# Patient Record
Sex: MALE | Race: OTHER | Employment: UNEMPLOYED | URBAN - METROPOLITAN AREA
[De-identification: names, ages, dates, MRNs, and addresses within clinical notes are randomized per-mention and may not be internally consistent; named-entity substitution may affect disease eponyms.]

---

## 2021-05-12 ENCOUNTER — OFFICE VISIT (OUTPATIENT)
Dept: FAMILY MEDICINE CLINIC | Facility: CLINIC | Age: 46
End: 2021-05-12
Payer: MEDICAID

## 2021-05-12 VITALS
HEART RATE: 72 BPM | BODY MASS INDEX: 31.02 KG/M2 | SYSTOLIC BLOOD PRESSURE: 114 MMHG | WEIGHT: 193 LBS | RESPIRATION RATE: 16 BRPM | TEMPERATURE: 97 F | HEIGHT: 66 IN | DIASTOLIC BLOOD PRESSURE: 86 MMHG

## 2021-05-12 DIAGNOSIS — K21.00 GASTROESOPHAGEAL REFLUX DISEASE WITH ESOPHAGITIS WITHOUT HEMORRHAGE: Primary | ICD-10-CM

## 2021-05-12 DIAGNOSIS — S61.219A LACERATION OF FINGER WITHOUT COMPLICATION, INITIAL ENCOUNTER: ICD-10-CM

## 2021-05-12 PROBLEM — I31.3 PERICARDIAL EFFUSION: Status: ACTIVE | Noted: 2021-05-12

## 2021-05-12 PROBLEM — I31.39 PERICARDIAL EFFUSION (HCC): Status: ACTIVE | Noted: 2021-05-12

## 2021-05-12 PROBLEM — I31.39 PERICARDIAL EFFUSION: Status: ACTIVE | Noted: 2021-05-12

## 2021-05-12 PROCEDURE — 3008F BODY MASS INDEX DOCD: CPT | Performed by: FAMILY MEDICINE

## 2021-05-12 PROCEDURE — 3074F SYST BP LT 130 MM HG: CPT | Performed by: FAMILY MEDICINE

## 2021-05-12 PROCEDURE — 3079F DIAST BP 80-89 MM HG: CPT | Performed by: FAMILY MEDICINE

## 2021-05-12 PROCEDURE — 99204 OFFICE O/P NEW MOD 45 MIN: CPT | Performed by: FAMILY MEDICINE

## 2021-05-12 RX ORDER — HYDROCODONE BITARTRATE AND ACETAMINOPHEN 5; 325 MG/1; MG/1
1 TABLET ORAL EVERY 8 HOURS PRN
Qty: 30 TABLET | Refills: 0 | Status: SHIPPED | OUTPATIENT
Start: 2021-05-12 | End: 2021-05-22

## 2021-05-12 RX ORDER — ALBUTEROL SULFATE 90 UG/1
AEROSOL, METERED RESPIRATORY (INHALATION) AS NEEDED
COMMUNITY
End: 2021-06-24

## 2021-05-12 RX ORDER — HYDROCODONE BITARTRATE AND ACETAMINOPHEN 5; 325 MG/1; MG/1
1 TABLET ORAL EVERY 6 HOURS PRN
COMMUNITY
Start: 2021-05-05 | End: 2021-05-12

## 2021-05-12 RX ORDER — NAPROXEN 500 MG/1
TABLET ORAL AS NEEDED
COMMUNITY
Start: 2021-05-11

## 2021-05-12 RX ORDER — CEPHALEXIN 500 MG/1
500 CAPSULE ORAL EVERY 6 HOURS
COMMUNITY
Start: 2021-05-06 | End: 2021-05-24 | Stop reason: ALTCHOICE

## 2021-05-12 NOTE — PROGRESS NOTES
Heather Andrade is a 39year old male.   HPI:   Ann López  Is here for follow up on finger lacerations after he was cutting a board and tried to adjust it and got her right 2nd and 3rd digits into the saw blade, he went to the ER where he had imaging done that 114/86 (BP Location: Left arm, Patient Position: Sitting, Cuff Size: large)   Pulse 72   Temp 97.4 °F (36.3 °C) (Temporal)   Resp 16   Ht 5' 6\" (1.676 m)   Wt 193 lb (87.5 kg)   BMI 31.15 kg/m²   GENERAL: well developed, well nourished,in no apparent dist

## 2021-05-24 ENCOUNTER — OFFICE VISIT (OUTPATIENT)
Dept: FAMILY MEDICINE CLINIC | Facility: CLINIC | Age: 46
End: 2021-05-24
Payer: MEDICAID

## 2021-05-24 VITALS
SYSTOLIC BLOOD PRESSURE: 102 MMHG | DIASTOLIC BLOOD PRESSURE: 76 MMHG | BODY MASS INDEX: 31 KG/M2 | HEART RATE: 80 BPM | TEMPERATURE: 98 F | WEIGHT: 190.38 LBS | RESPIRATION RATE: 18 BRPM

## 2021-05-24 DIAGNOSIS — K21.00 GASTROESOPHAGEAL REFLUX DISEASE WITH ESOPHAGITIS WITHOUT HEMORRHAGE: Primary | ICD-10-CM

## 2021-05-24 DIAGNOSIS — M25.512 ACUTE PAIN OF LEFT SHOULDER: ICD-10-CM

## 2021-05-24 DIAGNOSIS — S61.219A LACERATION OF FINGER WITHOUT COMPLICATION, INITIAL ENCOUNTER: ICD-10-CM

## 2021-05-24 PROCEDURE — 3078F DIAST BP <80 MM HG: CPT | Performed by: FAMILY MEDICINE

## 2021-05-24 PROCEDURE — 99214 OFFICE O/P EST MOD 30 MIN: CPT | Performed by: FAMILY MEDICINE

## 2021-05-24 PROCEDURE — 3074F SYST BP LT 130 MM HG: CPT | Performed by: FAMILY MEDICINE

## 2021-05-24 RX ORDER — OMEPRAZOLE 40 MG/1
40 CAPSULE, DELAYED RELEASE ORAL NIGHTLY
Qty: 30 CAPSULE | Refills: 0 | Status: SHIPPED | OUTPATIENT
Start: 2021-05-24 | End: 2021-06-24

## 2021-05-24 RX ORDER — HYDROCODONE BITARTRATE AND ACETAMINOPHEN 5; 325 MG/1; MG/1
1 TABLET ORAL AS NEEDED
COMMUNITY

## 2021-05-24 NOTE — PROGRESS NOTES
Radha Deras is a 39year old male.   HPI:   Andrey Malloy  Is here for follow up on finger lacerations after he was cutting a board and tried to adjust it and got her right 2nd and 3rd digits into the saw blade, he went to the ER where he had imaging done that except for his fingers and his reflux  HEENT: denies recurrent sinus issues  SKIN: denies any unusual skin lesions or rashes  RESPIRATORY: denies shortness of breath with exertion  CARDIOVASCULAR: denies chest pain on exertion  GI: denies abdominal pain an at HS 40 mg.

## 2021-06-08 ENCOUNTER — TELEMEDICINE (OUTPATIENT)
Dept: FAMILY MEDICINE CLINIC | Facility: CLINIC | Age: 46
End: 2021-06-08
Payer: MEDICAID

## 2021-06-08 DIAGNOSIS — K21.00 GASTROESOPHAGEAL REFLUX DISEASE WITH ESOPHAGITIS WITHOUT HEMORRHAGE: Primary | ICD-10-CM

## 2021-06-08 DIAGNOSIS — J45.21 MILD INTERMITTENT ASTHMA WITH ACUTE EXACERBATION: ICD-10-CM

## 2021-06-08 DIAGNOSIS — J98.01 BRONCHOSPASM: ICD-10-CM

## 2021-06-08 PROCEDURE — 99214 OFFICE O/P EST MOD 30 MIN: CPT | Performed by: FAMILY MEDICINE

## 2021-06-08 RX ORDER — ALBUTEROL SULFATE 2.5 MG/3ML
2.5 SOLUTION RESPIRATORY (INHALATION) EVERY 4 HOURS PRN
Qty: 1 EACH | Refills: 3 | Status: SHIPPED | OUTPATIENT
Start: 2021-06-08 | End: 2021-07-08

## 2021-06-08 RX ORDER — ALBUTEROL SULFATE 90 UG/1
2 AEROSOL, METERED RESPIRATORY (INHALATION) EVERY 6 HOURS PRN
Qty: 1 EACH | Refills: 2 | Status: SHIPPED | OUTPATIENT
Start: 2021-06-08 | End: 2021-07-08

## 2021-06-08 RX ORDER — METHYLPREDNISOLONE 4 MG/1
TABLET ORAL
Qty: 1 EACH | Refills: 0 | Status: SHIPPED | OUTPATIENT
Start: 2021-06-08 | End: 2021-06-24 | Stop reason: ALTCHOICE

## 2021-06-08 NOTE — PROGRESS NOTES
This is a telemedicine visit with live, interactive video and audio. Patient understands and accepts financial responsibility for any deductible, co-insurance and/or co-pays associated with this service.     Yoly Anaya was cutting grass the other Oral Tab      • Albuterol Sulfate  (90 Base) MCG/ACT Inhalation Aero Soln Inhale into the lungs as needed for Wheezing. • Sucralfate (CARAFATE OR) Take by mouth.  (Patient not taking: Reported on 5/24/2021 )         OBJECTIVE  Physical Exam:

## 2021-06-24 ENCOUNTER — OFFICE VISIT (OUTPATIENT)
Dept: FAMILY MEDICINE CLINIC | Facility: CLINIC | Age: 46
End: 2021-06-24
Payer: MEDICAID

## 2021-06-24 VITALS
WEIGHT: 189.63 LBS | TEMPERATURE: 98 F | BODY MASS INDEX: 31 KG/M2 | SYSTOLIC BLOOD PRESSURE: 104 MMHG | RESPIRATION RATE: 20 BRPM | DIASTOLIC BLOOD PRESSURE: 70 MMHG | HEART RATE: 92 BPM

## 2021-06-24 DIAGNOSIS — S61.219A LACERATION OF FINGER WITHOUT COMPLICATION, INITIAL ENCOUNTER: ICD-10-CM

## 2021-06-24 DIAGNOSIS — K21.00 GASTROESOPHAGEAL REFLUX DISEASE WITH ESOPHAGITIS WITHOUT HEMORRHAGE: ICD-10-CM

## 2021-06-24 DIAGNOSIS — Z00.00 ROUTINE HEALTH MAINTENANCE: Primary | ICD-10-CM

## 2021-06-24 DIAGNOSIS — R73.9 BLOOD GLUCOSE ELEVATED: ICD-10-CM

## 2021-06-24 DIAGNOSIS — J98.01 BRONCHOSPASM: ICD-10-CM

## 2021-06-24 DIAGNOSIS — J45.21 MILD INTERMITTENT ASTHMA WITH ACUTE EXACERBATION: ICD-10-CM

## 2021-06-24 PROCEDURE — 99214 OFFICE O/P EST MOD 30 MIN: CPT | Performed by: FAMILY MEDICINE

## 2021-06-24 PROCEDURE — 80061 LIPID PANEL: CPT | Performed by: FAMILY MEDICINE

## 2021-06-24 PROCEDURE — 83036 HEMOGLOBIN GLYCOSYLATED A1C: CPT | Performed by: FAMILY MEDICINE

## 2021-06-24 PROCEDURE — 3074F SYST BP LT 130 MM HG: CPT | Performed by: FAMILY MEDICINE

## 2021-06-24 PROCEDURE — 80053 COMPREHEN METABOLIC PANEL: CPT | Performed by: FAMILY MEDICINE

## 2021-06-24 PROCEDURE — 84443 ASSAY THYROID STIM HORMONE: CPT | Performed by: FAMILY MEDICINE

## 2021-06-24 PROCEDURE — 85025 COMPLETE CBC W/AUTO DIFF WBC: CPT | Performed by: FAMILY MEDICINE

## 2021-06-24 PROCEDURE — 3078F DIAST BP <80 MM HG: CPT | Performed by: FAMILY MEDICINE

## 2021-06-24 RX ORDER — OMEPRAZOLE 40 MG/1
40 CAPSULE, DELAYED RELEASE ORAL 2 TIMES DAILY
Qty: 60 CAPSULE | Refills: 2 | Status: SHIPPED | OUTPATIENT
Start: 2021-06-24 | End: 2021-07-24

## 2021-06-24 NOTE — PROGRESS NOTES
Stuart Lowe is a 39year old male. HPI:   Neptali Moses is here for follow up on his finger laceration after he cut it on a table saw.  It is healing nicely he still has some sensitivity to the distal tip of the right middle finger, also still has some residu (Temporal)   Resp 20   Wt 189 lb 9.6 oz (86 kg)   BMI 30.60 kg/m²   GENERAL: well developed, well nourished,in no apparent distress  SKIN: no rashes,no suspicious lesions  HEENT: atraumatic, normocephalic,ears and throat are clear  NECK: supple,no adenopat

## 2021-06-26 ENCOUNTER — TELEPHONE (OUTPATIENT)
Dept: FAMILY MEDICINE CLINIC | Facility: CLINIC | Age: 46
End: 2021-06-26

## 2021-06-26 DIAGNOSIS — R73.09 ELEVATED HEMOGLOBIN A1C: ICD-10-CM

## 2021-06-26 DIAGNOSIS — R79.89 ELEVATED TSH: Primary | ICD-10-CM

## 2021-06-26 NOTE — TELEPHONE ENCOUNTER
[facility] sent to pt regarding DS result note and recommendations.  Future lab orders were placed by DS

## 2021-06-26 NOTE — TELEPHONE ENCOUNTER
----- Message from Sal Garza DO sent at 6/26/2021  8:57 AM CDT -----  [email or web address] for [facility] most part looked good.  His lipids were  Excellent kidney, liver function,  and blood count were all normal, but his blood sugar was sl

## 2021-08-30 ENCOUNTER — TELEPHONE (OUTPATIENT)
Dept: FAMILY MEDICINE CLINIC | Facility: CLINIC | Age: 46
End: 2021-08-30

## 2021-10-26 ENCOUNTER — TELEPHONE (OUTPATIENT)
Dept: FAMILY MEDICINE CLINIC | Facility: CLINIC | Age: 46
End: 2021-10-26

## 2023-01-26 ENCOUNTER — TELEPHONE (OUTPATIENT)
Dept: FAMILY MEDICINE CLINIC | Facility: CLINIC | Age: 48
End: 2023-01-26

## 2023-01-26 NOTE — TELEPHONE ENCOUNTER
Future Appointments   Date Time Provider Suzanne Lopez   1/31/2023  2:00 PM Soham Canas DO [facility] MYRIAM Lantigua
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
## 2023-01-31 ENCOUNTER — OFFICE VISIT (OUTPATIENT)
Dept: FAMILY MEDICINE CLINIC | Facility: CLINIC | Age: 48
End: 2023-01-31
Payer: COMMERCIAL

## 2023-01-31 VITALS
RESPIRATION RATE: 18 BRPM | DIASTOLIC BLOOD PRESSURE: 70 MMHG | SYSTOLIC BLOOD PRESSURE: 108 MMHG | HEART RATE: 85 BPM | TEMPERATURE: 97 F | WEIGHT: 208.13 LBS | OXYGEN SATURATION: 98 % | BODY MASS INDEX: 34 KG/M2

## 2023-01-31 DIAGNOSIS — M54.2 NECK PAIN, ACUTE: Primary | ICD-10-CM

## 2023-01-31 DIAGNOSIS — M79.10 MYALGIA: ICD-10-CM

## 2023-01-31 DIAGNOSIS — M54.50 ACUTE LEFT-SIDED LOW BACK PAIN WITHOUT SCIATICA: ICD-10-CM

## 2023-01-31 PROCEDURE — 99213 OFFICE O/P EST LOW 20 MIN: CPT | Performed by: FAMILY MEDICINE

## 2023-01-31 PROCEDURE — 3074F SYST BP LT 130 MM HG: CPT | Performed by: FAMILY MEDICINE

## 2023-01-31 PROCEDURE — 3078F DIAST BP <80 MM HG: CPT | Performed by: FAMILY MEDICINE

## 2023-01-31 RX ORDER — ALBUTEROL SULFATE 90 UG/1
2 AEROSOL, METERED RESPIRATORY (INHALATION) EVERY 6 HOURS PRN
Qty: 1 EACH | Refills: 1 | Status: SHIPPED | OUTPATIENT
Start: 2023-01-31 | End: 2023-03-02

## 2023-07-10 ENCOUNTER — TELEPHONE (OUTPATIENT)
Dept: FAMILY MEDICINE CLINIC | Facility: CLINIC | Age: 48
End: 2023-07-10

## 2023-07-10 NOTE — TELEPHONE ENCOUNTER
[facility] CALLED BACK AND [facility] IS ONLY ABLE TO FIND 15-20MM THIGH HIGH COMPRESSION STOCKINGS. IS THIS GOING TO BE OK?  DOESN'T WANT TO GET IF ITS NOT GOING TO WORK.     PLEASE ADV    THANK YOU

## 2023-07-10 NOTE — TELEPHONE ENCOUNTER
PATIENT SPOUSE CALLING THIS MORNING AND SCHEDULED PATIENT FOR TOMORROW AFTERNOON TO FOLLOW UP FROM ER. SPOUSE SAYS PATIENT INSTRUCTED TO WEAR THIGH HIGH RACHELE HOSE ASAP. PATIENT SPOUSE ASKING WHICH KIND. MANY DIFFERENT TYPES PER SPOUSE. IF WE NEED TO WAIT UNTIL TOMORROW UNTLI PATIENT IS SEEN TO DETERMINE TYPE THAT WOULD BE OK. SPOUSE JUST WONDERING IF THERE IS A BEGINNERS ONE?

## 2023-07-10 NOTE — TELEPHONE ENCOUNTER
Lenard Travis can get them in different, compression 10-15 mm to star and can get them sometimes at [facility] and can order on line

## 2023-07-10 NOTE — TELEPHONE ENCOUNTER
Future Appointments   Date Time Provider Suzanne Lopez   7/12/2023  9:40 AM Kira Villalba, [facility] Che Bauman     RN spoke to girlfriend and advised of the information below- verbalized understanding

## 2023-07-12 ENCOUNTER — OFFICE VISIT (OUTPATIENT)
Dept: FAMILY MEDICINE CLINIC | Facility: CLINIC | Age: 48
End: 2023-07-12
Payer: MEDICAID

## 2023-07-12 ENCOUNTER — HOSPITAL ENCOUNTER (OUTPATIENT)
Dept: GENERAL RADIOLOGY | Age: 48
Discharge: HOME | End: 2023-07-12
Attending: FAMILY MEDICINE
Payer: MEDICAID

## 2023-07-12 VITALS
OXYGEN SATURATION: 97 % | BODY MASS INDEX: 31 KG/M2 | RESPIRATION RATE: 18 BRPM | SYSTOLIC BLOOD PRESSURE: 106 MMHG | HEART RATE: 82 BPM | WEIGHT: 193.38 LBS | DIASTOLIC BLOOD PRESSURE: 64 MMHG | TEMPERATURE: 97 F

## 2023-07-12 DIAGNOSIS — G89.29 CHRONIC PAIN OF LEFT KNEE: Primary | ICD-10-CM

## 2023-07-12 DIAGNOSIS — J45.21 MILD INTERMITTENT ASTHMA WITH ACUTE EXACERBATION: ICD-10-CM

## 2023-07-12 DIAGNOSIS — M25.562 CHRONIC PAIN OF LEFT KNEE: ICD-10-CM

## 2023-07-12 DIAGNOSIS — I83.12 VARICOSE VEINS OF LEFT LOWER EXTREMITY WITH INFLAMMATION: ICD-10-CM

## 2023-07-12 DIAGNOSIS — G89.29 CHRONIC PAIN OF LEFT KNEE: ICD-10-CM

## 2023-07-12 DIAGNOSIS — M25.562 CHRONIC PAIN OF LEFT KNEE: Primary | ICD-10-CM

## 2023-07-12 DIAGNOSIS — M25.562 PATELLOFEMORAL ARTHRALGIA OF LEFT KNEE: ICD-10-CM

## 2023-07-12 PROCEDURE — 73562 X-RAY EXAM OF KNEE 3: CPT | Performed by: FAMILY MEDICINE

## 2023-07-12 PROCEDURE — 99214 OFFICE O/P EST MOD 30 MIN: CPT | Performed by: FAMILY MEDICINE

## 2023-07-12 PROCEDURE — 3074F SYST BP LT 130 MM HG: CPT | Performed by: FAMILY MEDICINE

## 2023-07-12 PROCEDURE — 3078F DIAST BP <80 MM HG: CPT | Performed by: FAMILY MEDICINE

## 2023-07-12 RX ORDER — ALBUTEROL SULFATE 90 UG/1
2 AEROSOL, METERED RESPIRATORY (INHALATION) EVERY 6 HOURS PRN
Qty: 1 EACH | Refills: 1 | Status: SHIPPED | OUTPATIENT
Start: 2023-07-12 | End: 2023-08-11

## 2023-07-12 RX ORDER — ALBUTEROL SULFATE 2.5 MG/3ML
2.5 SOLUTION RESPIRATORY (INHALATION) ONCE
Status: DISCONTINUED | OUTPATIENT
Start: 2023-07-12 | End: 2023-07-12

## 2023-07-12 RX ORDER — ALBUTEROL SULFATE 2.5 MG/3ML
2.5 SOLUTION RESPIRATORY (INHALATION) EVERY 4 HOURS PRN
Qty: 1 EACH | Refills: 3 | Status: SHIPPED | OUTPATIENT
Start: 2023-07-12

## 2024-03-23 ENCOUNTER — OFFICE VISIT (OUTPATIENT)
Dept: FAMILY MEDICINE CLINIC | Facility: CLINIC | Age: 49
End: 2024-03-23
Payer: MEDICAID

## 2024-03-23 VITALS
HEART RATE: 86 BPM | BODY MASS INDEX: 32.49 KG/M2 | DIASTOLIC BLOOD PRESSURE: 84 MMHG | WEIGHT: 195 LBS | HEIGHT: 65 IN | TEMPERATURE: 99 F | RESPIRATION RATE: 16 BRPM | SYSTOLIC BLOOD PRESSURE: 132 MMHG

## 2024-03-23 DIAGNOSIS — J02.9 PHARYNGITIS, UNSPECIFIED ETIOLOGY: Primary | ICD-10-CM

## 2024-03-23 DIAGNOSIS — J06.9 VIRAL URI: ICD-10-CM

## 2024-03-23 DIAGNOSIS — H92.01 OTALGIA, RIGHT: ICD-10-CM

## 2024-03-23 LAB
CONTROL LINE PRESENT WITH A CLEAR BACKGROUND (YES/NO): YES YES/NO
KIT LOT #: 7282 NUMERIC

## 2024-03-23 PROCEDURE — 99214 OFFICE O/P EST MOD 30 MIN: CPT | Performed by: FAMILY MEDICINE

## 2024-03-23 PROCEDURE — 87880 STREP A ASSAY W/OPTIC: CPT | Performed by: FAMILY MEDICINE

## 2024-03-23 RX ORDER — ALBUTEROL SULFATE 2.5 MG/3ML
2.5 SOLUTION RESPIRATORY (INHALATION) EVERY 4 HOURS PRN
Qty: 1 EACH | Refills: 3 | Status: SHIPPED | OUTPATIENT
Start: 2024-03-23

## 2024-03-23 RX ORDER — PREDNISONE 10 MG/1
TABLET ORAL
Qty: 18 TABLET | Refills: 0 | Status: SHIPPED | OUTPATIENT
Start: 2024-03-23

## 2024-03-23 NOTE — PROGRESS NOTES
HPI:   Ford Gifford is a 48 year old male who presents for upper respiratory symptoms for  1  days. Patient reports sore throat, congestion. Has pain swallowing and even talking and also experiencing ear pain. No fever, no abdominal pain, no N/V/D, no sick contacts    Current Outpatient Medications   Medication Sig Dispense Refill    predniSONE 10 MG Oral Tab 3 pills for 3 days, 2 pills for 3 days, then one pill daily for 3 days 18 tablet 0    albuterol (2.5 MG/3ML) 0.083% Inhalation Nebu Soln Take 3 mL (2.5 mg total) by nebulization every 4 (four) hours as needed for Wheezing. 1 each 3    Omeprazole 40 MG Oral Capsule Delayed Release Take 1 capsule (40 mg total) by mouth 2 (two) times a day. (Patient not taking: Reported on 1/31/2023) 60 capsule 2    HYDROcodone-acetaminophen 5-325 MG Oral Tab Take 1 tablet by mouth as needed for Pain. (Patient not taking: Reported on 1/31/2023)        Past Medical History:   Diagnosis Date    Rheumatoid arthritis(714.0)       Past Surgical History:   Procedure Laterality Date    OPEN HEART SURGICAL PROFILE        Family History   Problem Relation Age of Onset    Colon Cancer Paternal Grandmother     Other (cad) Paternal Grandfather 52        CAD, with CABG      Social History     Socioeconomic History    Marital status:    Tobacco Use    Smoking status: Former    Smokeless tobacco: Never   Vaping Use    Vaping Use: Never used   Substance and Sexual Activity    Alcohol use: No    Drug use: Not Currently     Comment: marijuana         REVIEW OF SYSTEMS:   GENERAL: feels well otherwise  SKIN: no rashes  EYES:denies blurred vision or double vision  HEENT: congested, sore throat,  hurts to swallow, right ear pain  LUNGS: denies shortness of breath with exertion  CARDIOVASCULAR: denies chest pain on exertion  GI: no nausea or abdominal pain  NEURO: denies headaches    EXAM:   /84   Pulse 86   Temp 99 °F (37.2 °C) (Temporal)   Resp 16   Ht 5' 5\" (1.651 m)    Wt 195 lb (88.5 kg)   BMI 32.45 kg/m²   GENERAL: well developed, well nourished,in no apparent distress  SKIN: no rashes,no suspicious lesions  EYES:PERRLA, EOMI, normal optic disk,conjunctiva are clear  HEENT: atraumatic, normocephalic,ears and throat shows the posterior pharynx to be erythematous and edematous, there is exudate present on the left  the right side is larger than the left  NECK: supple,no adenopathy,no bruits  LUNGS: clear to auscultation  CARDIO: RRR without murmur      ASSESSMENT AND PLAN:     Encounter Diagnoses   Name Primary?    Pharyngitis, unspecified etiology Yes    Otalgia, right     Viral URI      Salt water gargles no motrin, vcan take tylenol  Orders Placed This Encounter   Procedures    Rapid Strep       Meds & Refills for this Visit:  Requested Prescriptions     Signed Prescriptions Disp Refills    predniSONE 10 MG Oral Tab 18 tablet 0     Sig: 3 pills for 3 days, 2 pills for 3 days, then one pill daily for 3 days    albuterol (2.5 MG/3ML) 0.083% Inhalation Nebu Soln 1 each 3     Sig: Take 3 mL (2.5 mg total) by nebulization every 4 (four) hours as needed for Wheezing.       Imaging & Consults:  None    The patient is asked to return if sx's persist or worsen.

## 2024-03-31 ENCOUNTER — OFFICE VISIT (OUTPATIENT)
Dept: FAMILY MEDICINE CLINIC | Facility: CLINIC | Age: 49
End: 2024-03-31
Payer: MEDICAID

## 2024-03-31 VITALS
HEIGHT: 65 IN | OXYGEN SATURATION: 95 % | TEMPERATURE: 99 F | WEIGHT: 192 LBS | HEART RATE: 116 BPM | DIASTOLIC BLOOD PRESSURE: 84 MMHG | SYSTOLIC BLOOD PRESSURE: 122 MMHG | RESPIRATION RATE: 16 BRPM | BODY MASS INDEX: 31.99 KG/M2

## 2024-03-31 DIAGNOSIS — R06.2 WHEEZING: ICD-10-CM

## 2024-03-31 DIAGNOSIS — R05.1 ACUTE COUGH: Primary | ICD-10-CM

## 2024-03-31 PROCEDURE — 99213 OFFICE O/P EST LOW 20 MIN: CPT | Performed by: NURSE PRACTITIONER

## 2024-03-31 NOTE — PROGRESS NOTES
CHIEF COMPLAINT:     Chief Complaint   Patient presents with    Cough     Chills, sweating. 102 fever and chills, ratle  Prednisone for about 1 week just finished.  OTC nebulizer not working       HPI:   Ford Gifford is a 48 year old male who presents for cough, congestion,fevers, chills, body aches and wheezing.  Patient reports symptoms present for the past week. Seen by pcp on 3/23. Placed on prednisone which seemed to help some. Notes symptoms never resolved and have been worsening over the past 4 days. Notes fevers to 102 and home nebulizers are note helping. REports shortness of breath and DEJESUS. Denies cp or dizziness..  Treating symptoms otc fever meds and nebulizers..   Tolerates PO well at home. No n/v/d.  Denies any other aggravating or relieving factors at home. Denies any other treatment attempts prior to arrival.       Current Outpatient Medications   Medication Sig Dispense Refill    predniSONE 10 MG Oral Tab 3 pills for 3 days, 2 pills for 3 days, then one pill daily for 3 days 18 tablet 0    albuterol (2.5 MG/3ML) 0.083% Inhalation Nebu Soln Take 3 mL (2.5 mg total) by nebulization every 4 (four) hours as needed for Wheezing. 1 each 3    Omeprazole 40 MG Oral Capsule Delayed Release Take 1 capsule (40 mg total) by mouth 2 (two) times a day. (Patient not taking: Reported on 1/31/2023) 60 capsule 2    HYDROcodone-acetaminophen 5-325 MG Oral Tab Take 1 tablet by mouth as needed for Pain. (Patient not taking: Reported on 1/31/2023)        Past Medical History:   Diagnosis Date    Rheumatoid arthritis(714.0)       Past Surgical History:   Procedure Laterality Date    OPEN HEART SURGICAL PROFILE           Social History     Socioeconomic History    Marital status:    Tobacco Use    Smoking status: Former    Smokeless tobacco: Never   Vaping Use    Vaping Use: Never used   Substance and Sexual Activity    Alcohol use: No    Drug use: Not Currently     Comment: marijuana         REVIEW OF  SYSTEMS:   GENERAL: + fevers. Notes decreased appetite  SKIN: no rashes or abnormal skin lesions  HEENT: Denies sore throat, + sinus symptoms, Denies ear pain  LUNGS: + nonproductive cough, +shortness of breath andwheezing,   CARDIOVASCULAR: denies chest pain or palpitations   GI: denies N/V/C or abdominal pain  NEURO: Denies headaches    EXAM:   /84   Pulse 116   Temp 99.1 °F (37.3 °C)   Resp 16   Ht 5' 5\" (1.651 m)   Wt 192 lb (87.1 kg)   SpO2 95%   BMI 31.95 kg/m²   GENERAL: well developed, well nourished,in no apparent distress  SKIN: no rashes,no suspicious lesions  HEAD: atraumatic, normocephalic.    EYES: conjunctiva clear  EARS: TM's intact and without erythema, no bulging, no retraction,no fluid, bony landmarks visualized. No erythema or swelling noted to ear canals or external ears.   NOSE: Nostrils patent, clear nasal discharge, nasal mucosa reddened   THROAT: Oral mucosa pink, moist. Posterior pharynx is mildly erythematous. No exudates. No tonsillar hypertrophy noted.  No trismus. Uvula midline with no swelling. Voice clear/normal. No stridor  NECK: Supple, non-tender  LUNGS: Nonproductive cough noted. Lungs coarse bilat. + inspiratory/expiratory wheezing in bilat lung fields.  Breathing is non labored.  CARDIO: RRR without murmur  EXTREMITIES: no cyanosis, clubbing or edema  LYMPH:  No lymphadenopathy.        ASSESSMENT AND PLAN:       ICD-10-CM    1. Acute cough  R05.1       2. Wheezing  R06.2         Discussed HPI and physical exam with pt. We discussed the limited diagnostic capacity of this clinic and there are dangerous conditions associated with his worsening cough, wheezing, fevers that I can not fully rule out. I advised he be seen in the ED. Pt verbalized understanding and notes she will go to the [facility]. He declined medical transport. We discussed the risks of not going by medical transport including worsening condition, permanent injury and/or death. He verbalized understanding  an signed refusal.        Patient Instructions   Please go directly to the emergency department for further evaluation and treatment.        The patient indicates understanding of these issues and agrees to the plan.

## 2024-04-02 ENCOUNTER — TELEMEDICINE (OUTPATIENT)
Dept: FAMILY MEDICINE CLINIC | Facility: CLINIC | Age: 49
End: 2024-04-02
Payer: MEDICAID

## 2024-04-02 DIAGNOSIS — R50.81 FEVER IN OTHER DISEASES: ICD-10-CM

## 2024-04-02 DIAGNOSIS — J10.1 INFLUENZA A: Primary | ICD-10-CM

## 2024-04-02 DIAGNOSIS — M79.10 MYALGIA: ICD-10-CM

## 2024-04-02 PROCEDURE — 99214 OFFICE O/P EST MOD 30 MIN: CPT | Performed by: FAMILY MEDICINE

## 2024-04-02 RX ORDER — OSELTAMIVIR PHOSPHATE 75 MG/1
75 CAPSULE ORAL 2 TIMES DAILY
Qty: 10 CAPSULE | Refills: 0 | Status: SHIPPED | OUTPATIENT
Start: 2024-04-02 | End: 2024-04-07

## 2024-04-02 NOTE — PROGRESS NOTES
This is a telemedicine visit with live, interactive video and audio.     Patient understands and accepts financial responsibility for any deductible, co-insurance and/or co-pays associated with this service.    SUBJECTIVE  Influenza A developed acute on set of myalgia, chills headache and  fever to 106, but would get down to 100. The worst of his Sx were SAT and Sunday was seen in an IC in [place] on Sun, but was told he was not a candidate  for Tamiflu because he had a sore throat the week before. But the worst of his Sx did not develop until Sun/Monday, and in spite of the prednisone is still feeling terrible. Taking tylenol for temp with little response    HISTORY:  Past Medical History:   Diagnosis Date    Rheumatoid arthritis(714.0)       Past Surgical History:   Procedure Laterality Date    OPEN HEART SURGICAL PROFILE        Family History   Problem Relation Age of Onset    Colon Cancer Paternal Grandmother     Other (cad) Paternal Grandfather 52        CAD, with CABG      Social History     Socioeconomic History    Marital status:    Tobacco Use    Smoking status: Former    Smokeless tobacco: Never   Vaping Use    Vaping Use: Never used   Substance and Sexual Activity    Alcohol use: No    Drug use: Not Currently     Comment: marijuana        Allergies   Allergen Reactions    Fish-Derived Products SWELLING    Lactose Intolerance OTHER (SEE COMMENTS)    Shellfish-Derived Products SWELLING      Current Outpatient Medications   Medication Sig Dispense Refill    oseltamivir 75 MG Oral Cap Take 1 capsule (75 mg total) by mouth 2 (two) times daily for 5 days. 10 capsule 0    albuterol (2.5 MG/3ML) 0.083% Inhalation Nebu Soln Take 3 mL (2.5 mg total) by nebulization every 4 (four) hours as needed for Wheezing. 1 each 3    Omeprazole 40 MG Oral Capsule Delayed Release Take 1 capsule (40 mg total) by mouth 2 (two) times a day. (Patient not taking: Reported on 1/31/2023) 60 capsule 2    HYDROcodone-acetaminophen  5-325 MG Oral Tab Take 1 tablet by mouth as needed for Pain. (Patient not taking: Reported on 1/31/2023)         OBJECTIVE  Physical Exam:   alert, appears stated age, cooperative, flushed, moderate distress, and moderately obese, Normocephalic, without obvious abnormality, atraumatic, lips, mucosa, and tongue normal; teeth and gums normal, Speaking in full sentences comfortably, and Normal work of breathing    ASSESSMENT & PLAN  Diagnoses and all orders for this visit:    Influenza A    Fever in other diseases    Myalgia    Other orders  -     oseltamivir 75 MG Oral Cap; Take 1 capsule (75 mg total) by mouth 2 (two) times daily for 5 days.      FINISH THE PREDNISONE HE WAS GIVEN , TYLENOL FOR TEMP ABOVE 100.5, STAY HYDRATED IF HE BECOMES MORE SOB TO GO TO THE ER FOR RE-EVALAUTION     Jonel Villalba, DO

## 2024-04-03 ENCOUNTER — TELEPHONE (OUTPATIENT)
Dept: FAMILY MEDICINE CLINIC | Facility: CLINIC | Age: 49
End: 2024-04-03

## 2024-04-03 NOTE — TELEPHONE ENCOUNTER
Ds Sent in Tamiflu- did he start? Wife states he started Tamiflu last night    They also started on Zpak- state they have mild pneumonia, but I was not on Xray from the weekend, it was on Xray yesterday.    Wife states he was started on inhaler- she states he did start that.    Fever today is good- 97.7.    Pt is stating he is feeling better. Taking in fluids well.    Was throwing up yesterday- but they gave him IV for fluids and sent him home with Zofran for nausea.     Wife wanted to let us know what happened after the video visit yesterday- please advise when you would like him to follow up

## 2024-04-03 NOTE — TELEPHONE ENCOUNTER
Pt had a virtual visit yesterday with Dr Villalba, spouse took him to ER last night for fever of 104.6. pt has pneumonia, started on z-pac, please call

## 2024-04-03 NOTE — TELEPHONE ENCOUNTER
Left message for pt to call back.    Wife called to inform of us of the er visit AND to find out when you would like to see him for a follow up?      Please advise when patient should schedule with you

## 2024-04-04 ENCOUNTER — OFFICE VISIT (OUTPATIENT)
Dept: FAMILY MEDICINE CLINIC | Facility: CLINIC | Age: 49
End: 2024-04-04
Payer: MEDICAID

## 2024-04-04 ENCOUNTER — TELEPHONE (OUTPATIENT)
Dept: FAMILY MEDICINE CLINIC | Facility: CLINIC | Age: 49
End: 2024-04-04

## 2024-04-04 VITALS
RESPIRATION RATE: 16 BRPM | WEIGHT: 195.38 LBS | BODY MASS INDEX: 33 KG/M2 | SYSTOLIC BLOOD PRESSURE: 112 MMHG | DIASTOLIC BLOOD PRESSURE: 66 MMHG | HEART RATE: 85 BPM | TEMPERATURE: 98 F | OXYGEN SATURATION: 94 %

## 2024-04-04 DIAGNOSIS — R68.84 JAW PAIN: ICD-10-CM

## 2024-04-04 DIAGNOSIS — B96.89 ACUTE BACTERIAL SIALADENITIS: Primary | ICD-10-CM

## 2024-04-04 DIAGNOSIS — K11.21 ACUTE BACTERIAL SIALADENITIS: Primary | ICD-10-CM

## 2024-04-04 PROCEDURE — 99213 OFFICE O/P EST LOW 20 MIN: CPT | Performed by: FAMILY MEDICINE

## 2024-04-04 RX ORDER — PREDNISONE 20 MG/1
20 TABLET ORAL AS DIRECTED
COMMUNITY
Start: 2024-03-31

## 2024-04-04 RX ORDER — ONDANSETRON 4 MG/1
4 TABLET, ORALLY DISINTEGRATING ORAL EVERY 8 HOURS PRN
COMMUNITY

## 2024-04-04 RX ORDER — IBUPROFEN 600 MG/1
600 TABLET ORAL EVERY 6 HOURS PRN
COMMUNITY

## 2024-04-04 RX ORDER — AZITHROMYCIN 250 MG/1
TABLET, FILM COATED ORAL
COMMUNITY
Start: 2024-04-02

## 2024-04-04 NOTE — PROGRESS NOTES
Ford Gifford is a 48 year old male.  HPI:   Ford has had the flu a fever and other URI sx he was started on Tamiflu and a z-anjelica yesterday and then developed swelling under the right jaw line some pain this AM but better now, no fever or chills, no trauma.  Current Outpatient Medications   Medication Sig Dispense Refill    azithromycin 250 MG Oral Tab Take 2 tablets by mouth on day 1, then on days 2-5 take one tablet by mouth.      ibuprofen 600 MG Oral Tab Take 1 tablet (600 mg total) by mouth every 6 (six) hours as needed for Pain.      ondansetron 4 MG Oral Tablet Dispersible Place 1 tablet (4 mg total) under the tongue every 8 (eight) hours as needed for Nausea.      predniSONE 20 MG Oral Tab Take 1 tablet (20 mg total) by mouth As Directed.      oseltamivir 75 MG Oral Cap Take 1 capsule (75 mg total) by mouth 2 (two) times daily for 5 days. 10 capsule 0    albuterol (2.5 MG/3ML) 0.083% Inhalation Nebu Soln Take 3 mL (2.5 mg total) by nebulization every 4 (four) hours as needed for Wheezing. 1 each 3    HYDROcodone-acetaminophen 5-325 MG Oral Tab Take 1 tablet by mouth as needed for Pain.      Omeprazole 40 MG Oral Capsule Delayed Release Take 1 capsule (40 mg total) by mouth 2 (two) times a day. (Patient not taking: Reported on 1/31/2023) 60 capsule 2      Past Medical History:   Diagnosis Date    Rheumatoid arthritis(714.0)       Social History:  Social History     Socioeconomic History    Marital status:    Tobacco Use    Smoking status: Former    Smokeless tobacco: Never   Vaping Use    Vaping Use: Never used   Substance and Sexual Activity    Alcohol use: No    Drug use: Not Currently     Comment: marijuana        REVIEW OF SYSTEMS:   GENERAL HEALTH: feels well otherwise  HEENT: has right sided jaw pain and swelling, along the salivary duct   SKIN: denies any unusual skin lesions or rashes  RESPIRATORY: denies shortness of breath with exertion  CARDIOVASCULAR: denies chest pain on  exertion  GI: denies abdominal pain and denies heartburn  NEURO: denies headaches    EXAM:   /66   Pulse 85   Temp 97.8 °F (36.6 °C) (Temporal)   Resp 16   Wt 195 lb 6 oz (88.6 kg)   SpO2 94%   BMI 32.51 kg/m²   GENERAL: well developed, well nourished,in no apparent distress  SKIN: no rashes,no suspicious lesions  HEENT: atraumatic, normocephalic,ears and throat are clear  NECK: supple,no adenopathy,no bruits, edema under the jaw  LUNGS: clear to auscultation  CARDIO: RRR without murmur  GI: good BS's,no masses, HSM or tenderness  EXTREMITIES: no cyanosis, clubbing or edema    ASSESSMENT AND PLAN:     Encounter Diagnoses   Name Primary?    Acute bacterial sialadenitis Yes    Jaw pain        No orders of the defined types were placed in this encounter.      Meds & Refills for this Visit:  Requested Prescriptions      No prescriptions requested or ordered in this encounter       Imaging & Consults:  None     The patient indicates understanding of these issues and agrees to the plan.  The patient is asked to return in prn if it persists.

## 2024-04-04 NOTE — TELEPHONE ENCOUNTER
Wife notified and scheduled appt  Future Appointments   Date Time Provider Department Center   4/4/2024  2:40 PM Jonel Villalba DO [facility]

## 2024-04-04 NOTE — TELEPHONE ENCOUNTER
Pt feeling better but has big knot in throat.  Do you want to see him?  States she can feel a ball on right side - under jaw - between jaw and neck.  Wasn't there before - Just appeared this morning.  Fever finally gone though.  Please advise.  Thank you!

## 2024-04-19 RX ORDER — LEVOFLOXACIN 750 MG/1
750 TABLET, FILM COATED ORAL DAILY
COMMUNITY
Start: 2024-04-16 | End: 2024-04-23

## 2024-04-23 ENCOUNTER — OFFICE VISIT (OUTPATIENT)
Dept: FAMILY MEDICINE CLINIC | Facility: CLINIC | Age: 49
End: 2024-04-23
Payer: MEDICAID

## 2024-04-23 VITALS
TEMPERATURE: 97 F | OXYGEN SATURATION: 97 % | RESPIRATION RATE: 16 BRPM | HEART RATE: 79 BPM | DIASTOLIC BLOOD PRESSURE: 78 MMHG | SYSTOLIC BLOOD PRESSURE: 116 MMHG

## 2024-04-23 DIAGNOSIS — Z12.11 COLON CANCER SCREENING: Primary | ICD-10-CM

## 2024-04-23 PROBLEM — J45.41 MODERATE PERSISTENT ASTHMA WITH ACUTE EXACERBATION (HCC): Status: ACTIVE | Noted: 2024-04-23

## 2024-04-23 PROCEDURE — 90471 IMMUNIZATION ADMIN: CPT | Performed by: FAMILY MEDICINE

## 2024-04-23 PROCEDURE — 90677 PCV20 VACCINE IM: CPT | Performed by: FAMILY MEDICINE

## 2024-04-23 PROCEDURE — 99214 OFFICE O/P EST MOD 30 MIN: CPT | Performed by: FAMILY MEDICINE

## 2024-04-23 RX ORDER — FLUTICASONE PROPIONATE AND SALMETEROL XINAFOATE 230; 21 UG/1; UG/1
2 AEROSOL, METERED RESPIRATORY (INHALATION) 2 TIMES DAILY
Qty: 1 EACH | Refills: 5 | Status: SHIPPED | OUTPATIENT
Start: 2024-04-23

## 2024-04-23 NOTE — PROGRESS NOTES
Ford Gifford is a 48 year old male.  HPI:   Ford has had the flu a fever and other URI sx he was started on Tamiflu and a z-anjelica yesterday and then developed swelling under the right jaw line some pain this AM but better now, no fever or chills, no trauma. HE IS USING THE NEBULIZER for when he cuts the grass, takes claritin D which helps , has not had a pneumonia shot in forever, has not had any labs had a colonoscopy a long time ago for diarrhea, but no other issues was told he did not have to RTC,   Current Outpatient Medications   Medication Sig Dispense Refill    fluticasone-salmeterol 230-21 MCG/ACT Inhalation Aerosol Inhale 2 puffs into the lungs 2 (two) times daily. 1 each 5    levoFLOXacin 750 MG Oral Tab Take 1 tablet (750 mg total) by mouth daily.      ibuprofen 600 MG Oral Tab Take 1 tablet (600 mg total) by mouth every 6 (six) hours as needed for Pain.      ondansetron 4 MG Oral Tablet Dispersible Place 1 tablet (4 mg total) under the tongue every 8 (eight) hours as needed for Nausea.      albuterol (2.5 MG/3ML) 0.083% Inhalation Nebu Soln Take 3 mL (2.5 mg total) by nebulization every 4 (four) hours as needed for Wheezing. 1 each 3    Omeprazole 40 MG Oral Capsule Delayed Release Take 1 capsule (40 mg total) by mouth 2 (two) times a day. 60 capsule 2    HYDROcodone-acetaminophen 5-325 MG Oral Tab Take 1 tablet by mouth as needed for Pain.      azithromycin 250 MG Oral Tab Take 2 tablets by mouth on day 1, then on days 2-5 take one tablet by mouth. (Patient not taking: Reported on 4/23/2024)      predniSONE 20 MG Oral Tab Take 1 tablet (20 mg total) by mouth As Directed. (Patient not taking: Reported on 4/23/2024)        Past Medical History:    Rheumatoid arthritis(714.0)      Social History:  Social History     Socioeconomic History    Marital status:    Tobacco Use    Smoking status: Former    Smokeless tobacco: Never   Vaping Use    Vaping status: Never Used   Substance and  Sexual Activity    Alcohol use: No    Drug use: Not Currently     Comment: marijuana     Social Determinants of Health     Food Insecurity: No Food Insecurity (3/31/2024)    [facility]    Food Insecurity     Currently or in the past 3 months, have you worried your food would run out before you had money to buy more?: No     In the past 12 months, have you run out of food or been unable to get more?: No   Transportation Needs: No Transportation Needs (12/29/2023)    [facility]    Transportation Needs     Medical Transportation Needs?: No    [facility] from [facility], [facility]    Social Connections    Received from [facility], [facility]    Housing Stability        REVIEW OF SYSTEMS:   GENERAL HEALTH: feels well otherwise  HEENT: denies congestion   SKIN: denies any unusual skin lesions or rashes  RESPIRATORY: denies shortness of breath with exertion, still has a slight cough, no wheezing  CARDIOVASCULAR: denies chest pain on exertion  GI: denies abdominal pain and denies heartburn  NEURO: denies headaches    EXAM:   /78   Pulse 79   Temp 97 °F (36.1 °C) (Temporal)   Resp 16   SpO2 97%   GENERAL: well developed, well nourished,in no apparent distress  SKIN: no rashes,no suspicious lesions  HEENT: atraumatic, normocephalic,ears and throat are clear  NECK: supple,no adenopathy,no bruits, edema under the jaw  LUNGS: clear to auscultation  CARDIO: RRR without murmur  GI: good BS's,no masses, HSM or tenderness  EXTREMITIES: no cyanosis, clubbing or edema    ASSESSMENT AND PLAN:     Encounter Diagnosis   Name Primary?    Colon cancer screening Yes         Orders Placed This Encounter   Procedures    Prevnar 20 (PCV20) [43061]       Meds & Refills for this Visit:  Requested Prescriptions     Signed  Prescriptions Disp Refills    fluticasone-salmeterol 230-21 MCG/ACT Inhalation Aerosol 1 each 5     Sig: Inhale 2 puffs into the lungs 2 (two) times daily.       Imaging & Consults:  COLOGUARD COLON CANCER SCREENING (EXTERNAL)  PCV20 VACCINE FOR INTRAMUSCULAR USE     The patient indicates understanding of these issues and agrees to the plan.  The patient is asked to return in prn if it persists.

## 2024-05-22 ENCOUNTER — OFFICE VISIT (OUTPATIENT)
Dept: FAMILY MEDICINE CLINIC | Facility: CLINIC | Age: 49
End: 2024-05-22

## 2024-05-22 VITALS
TEMPERATURE: 97 F | SYSTOLIC BLOOD PRESSURE: 112 MMHG | BODY MASS INDEX: 33 KG/M2 | DIASTOLIC BLOOD PRESSURE: 72 MMHG | OXYGEN SATURATION: 98 % | HEART RATE: 68 BPM | RESPIRATION RATE: 16 BRPM | WEIGHT: 197.5 LBS

## 2024-05-22 DIAGNOSIS — J45.41 MODERATE PERSISTENT ASTHMA WITH ACUTE EXACERBATION (HCC): Primary | ICD-10-CM

## 2024-05-22 DIAGNOSIS — R06.2 WHEEZING: ICD-10-CM

## 2024-05-22 PROCEDURE — 99213 OFFICE O/P EST LOW 20 MIN: CPT | Performed by: FAMILY MEDICINE

## 2024-05-22 NOTE — PROGRESS NOTES
Ford Gifford is a 48 year old male.  HPI:   Ford is here for follow up on his pneumonia, since he got the nebulizer is feeling much better, he is no longer coughing, wheezing or SHORTNESS OF BREATH, finished all meds  Current Outpatient Medications   Medication Sig Dispense Refill    fluticasone-salmeterol 230-21 MCG/ACT Inhalation Aerosol Inhale 2 puffs into the lungs 2 (two) times daily. 1 each 5    ibuprofen 600 MG Oral Tab Take 1 tablet (600 mg total) by mouth every 6 (six) hours as needed for Pain.      ondansetron 4 MG Oral Tablet Dispersible Place 1 tablet (4 mg total) under the tongue every 8 (eight) hours as needed for Nausea.      albuterol (2.5 MG/3ML) 0.083% Inhalation Nebu Soln Take 3 mL (2.5 mg total) by nebulization every 4 (four) hours as needed for Wheezing. 1 each 3    Omeprazole 40 MG Oral Capsule Delayed Release Take 1 capsule (40 mg total) by mouth 2 (two) times a day. 60 capsule 2    HYDROcodone-acetaminophen 5-325 MG Oral Tab Take 1 tablet by mouth as needed for Pain.      azithromycin 250 MG Oral Tab Take 2 tablets by mouth on day 1, then on days 2-5 take one tablet by mouth. (Patient not taking: Reported on 4/23/2024)      predniSONE 20 MG Oral Tab Take 1 tablet (20 mg total) by mouth As Directed. (Patient not taking: Reported on 4/23/2024)        Past Medical History:    Rheumatoid arthritis(714.0)      Social History:  Social History     Socioeconomic History    Marital status:    Tobacco Use    Smoking status: Former    Smokeless tobacco: Never   Vaping Use    Vaping status: Never Used   Substance and Sexual Activity    Alcohol use: No    Drug use: Not Currently     Comment: marijuana     Social Determinants of Health     Food Insecurity: No Food Insecurity (3/31/2024)    Received from [facility], [facility]    Food Insecurity     Currently or in the past 3 months, have you worried your food would run out before you had money  to buy more?: No     In the past 12 months, have you run out of food or been unable to get more?: No   Transportation Needs: No Transportation Needs (12/29/2023)    Received from [facility], [facility]    Transportation Needs     Medical Transportation Needs?: No    Received from [facility], [facility]    Social Connections    Received from [facility], [facility]    Housing Stability        REVIEW OF SYSTEMS:   GENERAL HEALTH: feels well otherwise  SKIN: denies any unusual skin lesions or rashes  RESPIRATORY: denies shortness of breath with exertion  CARDIOVASCULAR: denies chest pain on exertion  GI: denies abdominal pain and denies heartburn  NEURO: denies headaches  EXT: denies LE edema  EXAM:   /72   Pulse 68   Temp 97 °F (36.1 °C) (Temporal)   Resp 16   Wt 197 lb 8 oz (89.6 kg)   SpO2 98%   BMI 32.87 kg/m²   GENERAL: well developed, well nourished,in no apparent distress  SKIN: no rashes,no suspicious lesions  HEENT: atraumatic, normocephalic,ears and throat are clear  NECK: supple,no adenopathy,no bruits  LUNGS: clear to auscultation  CARDIO: RRR without murmur  GI: good BS's,no masses, HSM or tenderness  EXTREMITIES: no cyanosis, clubbing or edema    ASSESSMENT AND PLAN:     Encounter Diagnoses   Name Primary?    Moderate persistent asthma with acute exacerbation (HCC) Yes    Wheezing        Canuise the MDI but sparingly, if he needs that or nebulizer more athn 1-2 times per week then call      Imaging & Consults:  None     The patient indicates understanding of these issues and agrees to the plan.  The patient is asked to return in prn.

## 2024-06-20 ENCOUNTER — OFFICE VISIT (OUTPATIENT)
Dept: FAMILY MEDICINE CLINIC | Facility: CLINIC | Age: 49
End: 2024-06-20

## 2024-06-20 VITALS
RESPIRATION RATE: 16 BRPM | HEART RATE: 71 BPM | TEMPERATURE: 97 F | OXYGEN SATURATION: 98 % | DIASTOLIC BLOOD PRESSURE: 72 MMHG | BODY MASS INDEX: 33 KG/M2 | SYSTOLIC BLOOD PRESSURE: 122 MMHG | WEIGHT: 199.13 LBS

## 2024-06-20 DIAGNOSIS — J45.41 MODERATE PERSISTENT ASTHMA WITH ACUTE EXACERBATION (HCC): ICD-10-CM

## 2024-06-20 DIAGNOSIS — R06.2 WHEEZING: ICD-10-CM

## 2024-06-20 DIAGNOSIS — M25.562 BILATERAL ANTERIOR KNEE PAIN: Primary | ICD-10-CM

## 2024-06-20 DIAGNOSIS — M25.561 BILATERAL ANTERIOR KNEE PAIN: Primary | ICD-10-CM

## 2024-06-20 PROCEDURE — 99214 OFFICE O/P EST MOD 30 MIN: CPT | Performed by: FAMILY MEDICINE

## 2024-06-20 RX ORDER — MELOXICAM 7.5 MG/1
7.5 TABLET ORAL DAILY
Qty: 30 TABLET | Refills: 0 | Status: SHIPPED | OUTPATIENT
Start: 2024-06-20

## 2024-06-20 NOTE — PROGRESS NOTES
Ford Gifford is a 48 year old male.  HPI:   Brown had bilateral knee pain since April after he was hiking in [place] and fell and hit his knee, feel like the knee is tight, is wearing a compression stocking. Has pain when he tries to go up and down steps pain is all  anterior. Has taken some ibuprofen, with minimal response. No fever, having some issues with his asthma.  Especially now  the weather is hot and humid, he has advair but doesn't use it daily. Has an albuterol inhaler and doesn;t like the way it tastes  Current Outpatient Medications   Medication Sig Dispense Refill    fluticasone-salmeterol 230-21 MCG/ACT Inhalation Aerosol Inhale 2 puffs into the lungs 2 (two) times daily. 1 each 5    ibuprofen 600 MG Oral Tab Take 1 tablet (600 mg total) by mouth every 6 (six) hours as needed for Pain.      ondansetron 4 MG Oral Tablet Dispersible Place 1 tablet (4 mg total) under the tongue every 8 (eight) hours as needed for Nausea.      albuterol (2.5 MG/3ML) 0.083% Inhalation Nebu Soln Take 3 mL (2.5 mg total) by nebulization every 4 (four) hours as needed for Wheezing. 1 each 3    Omeprazole 40 MG Oral Capsule Delayed Release Take 1 capsule (40 mg total) by mouth 2 (two) times a day. 60 capsule 2    HYDROcodone-acetaminophen 5-325 MG Oral Tab Take 1 tablet by mouth as needed for Pain.      azithromycin 250 MG Oral Tab Take 2 tablets by mouth on day 1, then on days 2-5 take one tablet by mouth. (Patient not taking: Reported on 4/23/2024)      predniSONE 20 MG Oral Tab Take 1 tablet (20 mg total) by mouth As Directed. (Patient not taking: Reported on 4/23/2024)        Past Medical History:    Rheumatoid arthritis(714.0)      Social History:  Social History     Socioeconomic History    Marital status:    Tobacco Use    Smoking status: Former    Smokeless tobacco: Never   Vaping Use    Vaping status: Never Used   Substance and Sexual Activity    Alcohol use: No    Drug use: Not Currently      Comment: marijuana     Social Determinants of Health     Food Insecurity: No Food Insecurity (3/31/2024)    Received [facility], [facility]    Food Insecurity     Currently or in the past 3 months, have you worried your food would run out before you had money to buy more?: No     In the past 12 months, have you run out of food or been unable to get more?: No   Transportation Needs: No Transportation Needs (12/29/2023)    Received from [facility], [facility]    Transportation Needs     Medical Transportation Needs?: No    Received from [facility], [facility]    Social Connections    Received from [facility], [facility]    Housing Stability        REVIEW OF SYSTEMS:   GENERAL HEALTH: feels short of breath  SKIN: denies any unusual skin lesions or rashes  RESPIRATORY: denies shortness of breath with exertion  CARDIOVASCULAR: denies chest pain on exertion  GI: denies abdominal pain and denies heartburn  NEURO: denies headaches  EXT: has bilateral knee pain and swelling  EXAM:   /72   Pulse 71   Temp 97.3 °F (36.3 °C) (Temporal)   Resp 16   Wt 199 lb 2 oz (90.3 kg)   SpO2 98%   BMI 33.14 kg/m²   GENERAL: well developed, well nourished,in no apparent distress  SKIN: no rashes,no suspicious lesions  HEENT: atraumatic, normocephalic,ears and throat are clear  NECK: supple,no adenopathy,no bruits  LUNGS: clear to auscultation  CARDIO: RRR without murmur  GI: good BS's,no masses, HSM or tenderness  EXTREMITIES: no cyanosis, clubbing  there is some non pitting edema over the anterior knee's,  has some crepitance with flexion and extension.   ASSESSMENT AND PLAN:     Encounter Diagnoses   Name Primary?    Bilateral anterior knee pain Yes    Moderate persistent asthma with acute exacerbation (HCC)     Wheezing        No orders of the defined types were  placed in this encounter.      Meds & Refills for this Visit:  Requested Prescriptions     Signed Prescriptions Disp Refills    Meloxicam 7.5 MG Oral Tab 30 tablet 0     Sig: Take 1 tablet (7.5 mg total) by mouth daily.     Try the meloxicam at 7.5  daily and call with update     The patient indicates understanding of these issues and agrees to the plan.  The patient is asked to return in if hsi Sx persist. Us ethe ADVAIR DAILY, albuterol prn, rinse after use,

## 2024-06-22 ENCOUNTER — HOSPITAL ENCOUNTER (EMERGENCY)
Age: 49
Discharge: HOME | End: 2024-06-22
Attending: EMERGENCY MEDICINE

## 2024-06-22 VITALS
OXYGEN SATURATION: 100 % | SYSTOLIC BLOOD PRESSURE: 120 MMHG | HEART RATE: 74 BPM | WEIGHT: 199 LBS | RESPIRATION RATE: 16 BRPM | BODY MASS INDEX: 33 KG/M2 | DIASTOLIC BLOOD PRESSURE: 76 MMHG

## 2024-06-22 DIAGNOSIS — S41.111A LACERATION OF RIGHT UPPER EXTREMITY, INITIAL ENCOUNTER: Primary | ICD-10-CM

## 2024-06-22 PROCEDURE — 99283 EMERGENCY DEPT VISIT LOW MDM: CPT

## 2024-06-22 PROCEDURE — 12002 RPR S/N/AX/GEN/TRNK2.6-7.5CM: CPT
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
## 2024-06-25 ENCOUNTER — OFFICE VISIT (OUTPATIENT)
Dept: FAMILY MEDICINE CLINIC | Facility: CLINIC | Age: 49
End: 2024-06-25

## 2024-06-25 VITALS
TEMPERATURE: 98 F | BODY MASS INDEX: 33 KG/M2 | DIASTOLIC BLOOD PRESSURE: 68 MMHG | OXYGEN SATURATION: 97 % | RESPIRATION RATE: 18 BRPM | SYSTOLIC BLOOD PRESSURE: 114 MMHG | WEIGHT: 198.81 LBS | HEART RATE: 87 BPM

## 2024-06-25 DIAGNOSIS — S41.111A LACERATION OF ARM, RIGHT, INITIAL ENCOUNTER: Primary | ICD-10-CM

## 2024-06-25 PROCEDURE — 99213 OFFICE O/P EST LOW 20 MIN: CPT | Performed by: FAMILY MEDICINE
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
## 2024-07-02 ENCOUNTER — OFFICE VISIT (OUTPATIENT)
Dept: FAMILY MEDICINE CLINIC | Facility: CLINIC | Age: 49
End: 2024-07-02
Payer: MEDICAID

## 2024-07-02 DIAGNOSIS — S41.111A LACERATION OF ARM, RIGHT, INITIAL ENCOUNTER: Primary | ICD-10-CM

## 2024-07-02 PROCEDURE — 99213 OFFICE O/P EST LOW 20 MIN: CPT | Performed by: FAMILY MEDICINE
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
## 2024-08-20 RX ORDER — MELOXICAM 7.5 MG/1
7.5 TABLET ORAL DAILY
Qty: 30 TABLET | Refills: 0 | Status: SHIPPED | OUTPATIENT
Start: 2024-08-20

## 2024-08-20 NOTE — TELEPHONE ENCOUNTER
Non-Narcotic Pain Medication Protocol Passed08/20/2024 11:59 AM   Protocol Details In person appointment or virtual visit in the past 6 mos or appointment in next 3 mos   Refilled per protocol  Meloxicam 7.5 MG Oral Tab   Last refilled on 6/20/24 #30 with 0 rf.  LOV- 7/2/24  Last labs- 4/16/24    Sent to pharmacy

## 2024-11-01 RX ORDER — ALBUTEROL SULFATE 90 UG/1
2 INHALANT RESPIRATORY (INHALATION) EVERY 6 HOURS PRN
Qty: 8.5 G | Refills: 2 | Status: SHIPPED | OUTPATIENT
Start: 2024-11-01

## 2024-11-01 NOTE — TELEPHONE ENCOUNTER
Asthma & COPD Medication Protocol Srjnqi9611/01/2024 08:34 AM   Protocol Details Asthma Action Score greater than or equal to 20    AAP/ACT given in last 12 months    Appointment in past 6 or next 3 months      Routing to provider per protocol.   albuterol (2.5 MG/3ML) 0.083% Inhalation Nebu Soln   Last refilled on 3/23/24 for #1 each  with 3 rf.   Last labs 4/16/24.   Last seen on 7/2/24.     No future appointments.       Thank you.

## 2024-12-30 ENCOUNTER — TELEPHONE (OUTPATIENT)
Dept: FAMILY MEDICINE CLINIC | Facility: CLINIC | Age: 49
End: 2024-12-30

## 2024-12-30 NOTE — TELEPHONE ENCOUNTER
Pt's wife calling- pt has been wheezing and coughing since Saturday. Pt asking if he can be worked in tomorrow as wife has an appt at 10;20 am-     Please advise

## 2025-01-13 RX ORDER — ALBUTEROL SULFATE 90 UG/1
2 INHALANT RESPIRATORY (INHALATION) EVERY 6 HOURS PRN
Qty: 8.5 G | Refills: 2 | Status: SHIPPED | OUTPATIENT
Start: 2025-01-13

## 2025-01-13 NOTE — TELEPHONE ENCOUNTER
Pt failed refill protocol for the following reasons:    Asthma & COPD Medication Protocol [id number]/11/2025 04:52 PM   Protocol Details ACT Score greater than or equal to 20    Appointment in past 6 or next 3 months    ACT recorded in the last 12 months       Last refill: 11/01/2024 8.5g with 2 refills  Last appt: 7/2/2024  Next appt: No future appointments.      Forward to Dr. Villalba, please advise on refills. Thank you.

## 2025-02-01 ENCOUNTER — TELEPHONE (OUTPATIENT)
Dept: FAMILY MEDICINE CLINIC | Facility: CLINIC | Age: 50
End: 2025-02-01

## 2025-02-01 NOTE — TELEPHONE ENCOUNTER
PATIENT SPOUSE IS CALLING.  PATIENT IS HAVING SOME ISSUES WITH HIS STOMACH.  ALSO FEELING A BULGE IN CHEST AREA.  THIS COULD BE POSSIBLY TO HEARTBURN?  PATIENT NO LONGER HAS INSURANCE.  I DID PROVIDE THE SELF PAY INFORMATION.  SPOUSE IS FINE WITH THAT.

## 2025-02-01 NOTE — TELEPHONE ENCOUNTER
Patient notified and verbalized understanding.     Patient states he feels a bulge in his abdomen. If he pushes on it he notices it gives him heartburn.  Advised patient he will need appt for Dr Villalba to evaluate. Advised it could be hernia but without eval it is hard to say.  Future Appointments   Date Time Provider Department Center   2/4/2025 10:20 AM Jonel Villalba DO [facility]      Advised if develops worsening symptoms go to [facility] over the weekend

## 2025-02-04 ENCOUNTER — OFFICE VISIT (OUTPATIENT)
Dept: FAMILY MEDICINE CLINIC | Facility: CLINIC | Age: 50
End: 2025-02-04

## 2025-02-04 VITALS
WEIGHT: 198.38 LBS | DIASTOLIC BLOOD PRESSURE: 70 MMHG | BODY MASS INDEX: 33 KG/M2 | HEART RATE: 76 BPM | TEMPERATURE: 98 F | SYSTOLIC BLOOD PRESSURE: 120 MMHG | OXYGEN SATURATION: 98 % | RESPIRATION RATE: 16 BRPM

## 2025-02-04 DIAGNOSIS — M62.08 DIASTASIS RECTI: ICD-10-CM

## 2025-02-04 DIAGNOSIS — K21.00 GASTROESOPHAGEAL REFLUX DISEASE WITH ESOPHAGITIS WITHOUT HEMORRHAGE: Primary | ICD-10-CM

## 2025-02-04 PROCEDURE — 99213 OFFICE O/P EST LOW 20 MIN: CPT | Performed by: FAMILY MEDICINE

## 2025-02-04 NOTE — PROGRESS NOTES
Ford Gifford is a 49 year old male.  HPI:   Has had reflux issues, takes pepcid periodically, maybe twice a week, gets reflux at night and will take a chewable. Also has been trying to work out noted a bump in the middle of his abdomen, not painful notes it more whe he is doing situps. No change in Bowel habits, no melena or hematochezia  Current Outpatient Medications   Medication Sig Dispense Refill    ALBUTEROL 108 (90 Base) MCG/ACT Inhalation Aero Soln INHALE 2 PUFFS INTO THE LUNGS EVERY 6 HOURS AS NEEDED FOR WHEEZING 8.5 g 2    MELOXICAM 7.5 MG Oral Tab TAKE 1 TABLET(7.5 MG) BY MOUTH DAILY 30 tablet 0    fluticasone-salmeterol 230-21 MCG/ACT Inhalation Aerosol Inhale 2 puffs into the lungs 2 (two) times daily. 1 each 5    ibuprofen 600 MG Oral Tab Take 1 tablet (600 mg total) by mouth every 6 (six) hours as needed for Pain.      ondansetron 4 MG Oral Tablet Dispersible Place 1 tablet (4 mg total) under the tongue every 8 (eight) hours as needed for Nausea.      albuterol (2.5 MG/3ML) 0.083% Inhalation Nebu Soln Take 3 mL (2.5 mg total) by nebulization every 4 (four) hours as needed for Wheezing. 1 each 3      Past Medical History:    Rheumatoid arthritis(714.0)      Social History:  Social History     Socioeconomic History    Marital status:    Tobacco Use    Smoking status: Former    Smokeless tobacco: Never   Vaping Use    Vaping status: Never Used   Substance and Sexual Activity    Alcohol use: No    Drug use: Not Currently     Comment: marijuana     Social Drivers of Health     Food Insecurity: No Food Insecurity (3/31/2024)    [facility]    Food Insecurity     Currently or in the past 3 months, have you worried your food would run out before you had money to buy more?: No     In the past 12 months, have you run out of food or been unable to get more?: No   Transportation Needs: No Transportation Needs (12/29/2023)     Received from [facility], [facility]    Transportation Needs     Currently or in the past 3 months, has lack of transportation kept you from medical appointments, getting food or medicine, or providing care to a family member?: Unrecognized value     Medical Transportation Needs?: No    Received from [facility], [facility]    Housing Stability        REVIEW OF SYSTEMS:   GENERAL HEALTH: feels well otherwise  SKIN: denies any unusual skin lesions or rashes  RESPIRATORY: denies shortness of breath with exertion  CARDIOVASCULAR: denies chest pain on exertion  GI: denies abdominal pain HX of heartburn, has a bump midabdomen  NEURO: denies headaches    EXAM:   /70   Pulse 76   Temp 97.8 °F (36.6 °C) (Temporal)   Resp 16   Wt 198 lb 6 oz (90 kg)   SpO2 98%   BMI 33.01 kg/m²   GENERAL: well developed, well nourished,in no apparent distress  SKIN: no rashes,no suspicious lesions  HEENT: atraumatic, normocephalic,ears and throat are clear  NECK: supple,no adenopathy,no bruits  LUNGS: clear to auscultation  CARDIO: RRR without murmur  GI: good BS's,no masses, HSM,denies or tenderness, has a diastasis rectii  EXTREMITIES: no cyanosis, clubbing or edema    ASSESSMENT AND PLAN:     Encounter Diagnoses   Name Primary?    Gastroesophageal reflux disease with esophagitis without hemorrhage Yes    Diastasis recti      Take the pepcid daily was given instruction on exercises  elevate the head of the bed, try not to overeat or eat late      Imaging & Consults:  None;l     The patient indicates understanding of these issues and agrees to the plan.  The patient is asked to return in if sx persist.

## (undated) NOTE — LETTER
Date: 6/25/2024    Patient Name: Ford Gifford          To Whom it may concern:    This letter has been written at the patient's request. The above patient was seen at [facility] for treatment of a medical condition.    This patient should be excused from attending work  6/24/24 to 7/2/24.    The patient may return to work on 7/2/24 with the following limitations none.        Sincerely,          Jonel Villalba, DO

## (undated) NOTE — LETTER
Date: 6/25/2024    Patient Name: Ford Gifford          To Whom it may concern:    This letter has been written at the patient's request. The above patient was seen at [facility] for treatment of a medical condition.    This patient should be excused from attending work  6/24/24 to 6/31/24.    The patient may return to work on 7/1/24 with the following limitations none.        Sincerely,          Jonel Villalba, DO